# Patient Record
Sex: FEMALE | Race: AMERICAN INDIAN OR ALASKA NATIVE
[De-identification: names, ages, dates, MRNs, and addresses within clinical notes are randomized per-mention and may not be internally consistent; named-entity substitution may affect disease eponyms.]

---

## 2020-06-11 ENCOUNTER — HOSPITAL ENCOUNTER (EMERGENCY)
Dept: HOSPITAL 5 - ED | Age: 30
Discharge: HOME | End: 2020-06-11
Payer: SELF-PAY

## 2020-06-11 VITALS — SYSTOLIC BLOOD PRESSURE: 122 MMHG | DIASTOLIC BLOOD PRESSURE: 68 MMHG

## 2020-06-11 DIAGNOSIS — Z79.899: ICD-10-CM
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
## 2020-06-11 DIAGNOSIS — S05.02XA: Primary | ICD-10-CM
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
## 2020-06-11 DIAGNOSIS — Z98.890: ICD-10-CM

## 2020-06-11 NOTE — EMERGENCY DEPARTMENT REPORT
Eye Injury/Foreign Body





- HPI


Duration: Today


Eye Location: Left


Severity: Moderate


Tetanus Status: Up to Date


Eye Symptoms: Eye Pain: Yes, Blurred Vision: Yes, Eye Redness: Yes, 

Grinding/Hammering Metal: No, Used Eye Protection: No, Contact Lens Use: No, 

Recalls Injury: Yes, Photophobia: Yes


Other History: 30-year-old -American female presents to the emergency 

room complaining of left eye pain status post daughter accidentally scratched 

her eye this morning.  Patient reports that she has photophobia, eye tearing 

excessively, eye pain and mild swelling to the upper lid.





ED Review of Systems


ROS: 


Stated complaint: LEFT EYE PAIN


Other details as noted in HPI





Comment: All other systems reviewed and negative





ED Past Medical Hx





- Past Medical History


Hx Hypertension: No


Hx CVA: No


Hx Congestive Heart Failure: No


Hx Diabetes: No


Hx Deep Vein Thrombosis: No


Hx Renal Disease: No


Hx Sickle Cell Disease: No


Hx Seizures: No


Hx Asthma: No


Hx COPD: No


Hx HIV: No





- Surgical History


Past Surgical History?: Yes


Additional Surgical History: R Knee Sx 2001/ WRIST





- Social History


Smoking Status: Never Smoker


Substance Use Type: Alcohol





- Medications


Home Medications: 


                                Home Medications











 Medication  Instructions  Recorded  Confirmed  Last Taken  Type


 


Ferrous Sulfate 325 mg PO BID #60 tablet. 09/28/18  Unknown Rx


 


HYDROcodone/APAP 5-325 [[place] 1 each PO Q6HR PRN #20 tablet 09/28/18  Unknown Rx





5/325]     


 


Ibuprofen [Motrin] 800 mg PO Q8HR PRN #30 tablet 09/28/18  Unknown Rx


 


Erythromycin [Erythromycin Ophth 1 strip OS QID 10 Days #1 tube 06/11/20  

Unknown Rx





Oint]     














Eye Injury Exam





- Exam


General: 


Vital signs noted. No distress. Alert and acting appropriately.








- Visual Acuity


  ** Left


Eye Exam: Left Injection, Left EOMI, Left Fluorescein Uptake, Left Photophobia, 

Neither Abnormal Pupil, Neither Eye Foreign Body, Neither Lid Foreign Body, 

Neither Mucous Discharge, Neither Purulent Discharge





ED Course


                                   Vital Signs











  06/11/20





  15:26


 


Temperature 98.5 F


 


Pulse Rate 63


 


Respiratory 20





Rate 


 


Blood Pressure 124/90


 


O2 Sat by Pulse 99





Oximetry 














ED Medical Decision Making





- Medical Decision Making





30-year-old -American female presents to the emergency room complaining 

of left eye pain status post daughter accidentally scratched her eye this 

morning.  Patient reports that she has photophobia, eye tearing excessively, eye

pain and mild swelling to the upper lid.








Patient will be treated for corneal abrasion patient will be given Norco 5/325 

now and ibuprofen 600 mg p.o. now.  Patient be discharged home on erythromycin 

ophthalmic ointment every 6 hours for 10 days.  She was instructed to take 

ibuprofen 600 mg every 8 hours as needed for pain management.  Instructed to 

follow-up with an ophthalmologist.  Patient verbalized understanding


Critical care attestation.: 


If time is entered above; I have spent that time in minutes in the direct care 

of this critically ill patient, excluding procedure time.








ED Disposition


Clinical Impression: 


 Abrasion of cornea, left





Disposition: DC-01 TO HOME OR SELFCARE


Is pt being admited?: No


Does the pt Need Aspirin: No


Condition: Stable


Instructions:  Corneal Abrasion (ED)


Additional Instructions: 


Take ibuprofen as needed for pain management.  Use ointment as prescribed.  Is 

very important for you to follow-up with an ophthalmologist in the next 24 to 48

hours


Prescriptions: 


Erythromycin [Erythromycin Ophth Oint] 1 strip OS QID 10 Days #1 tube


Referrals: 


PRIMARY CARE,MD [Primary Care Provider] - 3-5 Days


ORLANDO TENA MD [Staff Physician] - 3-5 Days


[facility] [place] [Provider Group] - 3-5 Days


[facility] [Provider Group] - 3-5 Days


Forms:  Work/School Release Form(ED)

## 2020-06-11 NOTE — EMERGENCY DEPARTMENT REPORT
Blank Doc





- Documentation


Documentation: 





30-year-old female that presents with left eye pain after daugther accidently 

poked her.





This initial assessment/diagnostic orders/clinical plan/treatment(s) is/are 

subject to change based on patient's health status, clinical progression and re-

assessment by fellow clinical providers in the ED.  Further treatment and workup

at subsequent clinical providers discretion.  Patient/guardians urged not to 

elope from the ED as their condition may be serious if not clinically assessed 

and managed.  Initial orders include:


1- Patient sent to [facility] for further evaluation and treatment


2- woods lamp needed/visual testing

## 2021-07-12 ENCOUNTER — HOSPITAL ENCOUNTER (EMERGENCY)
Dept: HOSPITAL 5 - ED | Age: 31
Discharge: HOME | End: 2021-07-12
Payer: SELF-PAY

## 2021-07-12 VITALS — DIASTOLIC BLOOD PRESSURE: 73 MMHG | SYSTOLIC BLOOD PRESSURE: 138 MMHG

## 2021-07-12 DIAGNOSIS — O26.891: ICD-10-CM

## 2021-07-12 DIAGNOSIS — R10.2: ICD-10-CM

## 2021-07-12 DIAGNOSIS — O23.41: Primary | ICD-10-CM

## 2021-07-12 DIAGNOSIS — R30.0: ICD-10-CM

## 2021-07-12 DIAGNOSIS — O21.9: ICD-10-CM

## 2021-07-12 DIAGNOSIS — Z3A.01: ICD-10-CM

## 2021-07-12 DIAGNOSIS — Z79.899: ICD-10-CM

## 2021-07-12 LAB
ALBUMIN SERPL-MCNC: 4.6 G/DL (ref 3.9–5)
ALT SERPL-CCNC: 7 UNITS/L (ref 7–56)
BACTERIA #/AREA URNS HPF: (no result) /HPF
BASOPHILS # (AUTO): 0 K/MM3 (ref 0–0.1)
BASOPHILS NFR BLD AUTO: 0.4 % (ref 0–1.8)
BILIRUB UR QL STRIP: (no result)
BLOOD UR QL VISUAL: (no result)
BUN SERPL-MCNC: 7 MG/DL (ref 7–17)
BUN/CREAT SERPL: 14 %
CALCIUM SERPL-MCNC: 9.7 MG/DL (ref 8.4–10.2)
EOSINOPHIL # BLD AUTO: 0.1 K/MM3 (ref 0–0.4)
EOSINOPHIL NFR BLD AUTO: 1.5 % (ref 0–4.3)
HCT VFR BLD CALC: 41.4 % (ref 30.3–42.9)
HEMOLYSIS INDEX: 5
HGB BLD-MCNC: 13.8 GM/DL (ref 10.1–14.3)
LYMPHOCYTES # BLD AUTO: 2.1 K/MM3 (ref 1.2–5.4)
LYMPHOCYTES NFR BLD AUTO: 34.5 % (ref 13.4–35)
MCHC RBC AUTO-ENTMCNC: 33 % (ref 30–34)
MCV RBC AUTO: 87 FL (ref 79–97)
MONOCYTES # (AUTO): 0.3 K/MM3 (ref 0–0.8)
MONOCYTES % (AUTO): 5.6 % (ref 0–7.3)
MUCOUS THREADS #/AREA URNS HPF: (no result) /HPF
PH UR STRIP: 5 [PH] (ref 5–7)
PLATELET # BLD: 302 K/MM3 (ref 140–440)
RBC # BLD AUTO: 4.77 M/MM3 (ref 3.65–5.03)
RBC #/AREA URNS HPF: 16 /HPF (ref 0–6)
UROBILINOGEN UR-MCNC: 2 MG/DL (ref ?–2)
WBC #/AREA URNS HPF: 170 /HPF (ref 0–6)

## 2021-07-12 PROCEDURE — 76817 TRANSVAGINAL US OBSTETRIC: CPT

## 2021-07-12 PROCEDURE — 76801 OB US < 14 WKS SINGLE FETUS: CPT

## 2021-07-12 PROCEDURE — 85025 COMPLETE CBC W/AUTO DIFF WBC: CPT

## 2021-07-12 PROCEDURE — 96375 TX/PRO/DX INJ NEW DRUG ADDON: CPT

## 2021-07-12 PROCEDURE — 80053 COMPREHEN METABOLIC PANEL: CPT

## 2021-07-12 PROCEDURE — 81001 URINALYSIS AUTO W/SCOPE: CPT

## 2021-07-12 PROCEDURE — 36415 COLL VENOUS BLD VENIPUNCTURE: CPT

## 2021-07-12 PROCEDURE — 96365 THER/PROPH/DIAG IV INF INIT: CPT

## 2021-07-12 PROCEDURE — 99284 EMERGENCY DEPT VISIT MOD MDM: CPT

## 2021-07-12 PROCEDURE — 84702 CHORIONIC GONADOTROPIN TEST: CPT

## 2021-07-12 NOTE — ULTRASOUND REPORT
OB Ultrasound



HISTORY: pain in pregnancy.



TECHNIQUE:  Grayscale and color  imaging performed.



COMPARISON:  None



FINDINGS: Transabdominal and endovaginal imaging was performed.



Uterus measures 8.4 x 4.7 x 4.0 cm. There is an intrauterine gestational sac with mean diameter of 2.
3 cm. The size correlates with an EGA of 7 weeks and 2 days. A yolk sac is present. Fetal pole not vi
sualized at this point.



Both ovaries appear normal. There is trace simple pelvic free fluid.





IMPRESSION: Intrauterine gestational sac with a yolk sac identified but no fetal pole identified. Cor
relate with serial beta hCG and repeat pelvic ultrasound if needed.



Signer Name: Jose Fajardo MD 

Signed: 7/12/2021 12:39 PM

Workstation Name: [id number]

## 2021-07-12 NOTE — EVENT NOTE
ED Screening Note


Date of service: 07/12/21


Time: 10:51


ED Screening Note: 





Patient complains of nausea and vomiting x4 days


Currently 5 weeks pregnant


Also states left lower abdominal pain


Denies vaginal bleeding





This initial assessment/diagnostic orders/clinical plan/treatment(s) is/are 

subject to change based on patients health status, clinical progression and re-

assessment by fellow clinical providers in the ED. Further treatment and workup 

at subsequent clinical providers discretion. Patient/guardian urged not to elope

from the ED as their condition may be serious if not clinically assessed and 

managed. 





Initial orders include: 


Labs


Ultrasound

## 2021-07-12 NOTE — EMERGENCY DEPARTMENT REPORT
ED Pregnancy HPI





- General


Chief complaint: Nausea/Vomiting/Diarrhea


Stated complaint: VOMITING


Time Seen by Provider: 21 10:50


Source: patient


Mode of arrival: Ambulatory


Limitations: No Limitations





- History of Present Illness


Initial comments: 





This is a 31-year-old female  nontoxic, well nourished in appearance, no 

acute signs of distress presents to the ED with c/o of pelvic pressure, dysuria,

nausea vomiting times several days.  Patient stated she is currently about 6 

weeks pregnant.  Patient denies any vaginal bleeding or flank pain.  Patient 

that she does see her OB/GYN.  Patient denies any mid or upper abdominal pain.  

Patient denies any vaginal discharge or foul odor. Patient denies any chest 

pain, shortness of breathe, fever, chills, headache, stiff neck, numbness, 

tingling. Patient denies any urinary symptoms. Patient denies any allergies or 

PMH.


-: days(s)


Location: pelvis


Radiation: none


Severity: mild


Severity scale (0 -10): 3


Quality: other (pressure)


Consistency: intermittent


Improves with: none


Worsens with: other (urination)


Associated symptoms: nausea/vomiting, dysuria.  denies: vaginal bleeding, 

vaginal discharge, abdominal pain, headache, vision changes, malaise, 

dysparuenia, rash, seizure, shortness of breath, syncope, weakness


Vaginal bleeding: none


Pregnant:: Yes


Number of weeks pregnant: 6


Pre-patsy care: followed by OB





- Related Data


                                  Previous Rx's











 Medication  Instructions  Recorded  Last Taken  Type


 


Ferrous Sulfate 325 mg PO BID #60 tablet. 18 Unknown Rx


 


HYDROcodone/APAP 5-325 [[place] 1 each PO Q6HR PRN #20 tablet 18 Unknown Rx





5/325]    


 


Ibuprofen [Motrin] 800 mg PO Q8HR PRN #30 tablet 18 Unknown Rx


 


Erythromycin [Erythromycin Ophth 1 strip OS QID 10 Days #1 tube 20 Unknown

 Rx





Oint]    


 


Metoclopramide [Reglan] 10 mg PO Q12H PRN #12 tab 21 Unknown Rx


 


Nitrofurantoin Mono/M-Cryst 100 mg PO Q12HR #14 capsule 21 Unknown Rx





[Macrobid CAP]    











                                    Allergies











Allergy/AdvReac Type Severity Reaction Status Date / Time


 


No Known Allergies Allergy   Verified 20 15:22














ED Review of Systems


ROS: 


Stated complaint: VOMITING


Other details as noted in HPI





Comment: All other systems reviewed and negative


Constitutional: denies: chills, fever


Eyes: denies: eye pain, eye discharge, vision change


ENT: denies: ear pain, throat pain


Respiratory: denies: cough, shortness of breath, wheezing


Cardiovascular: denies: chest pain, palpitations


Endocrine: no symptoms reported


Gastrointestinal: denies: abdominal pain, nausea, diarrhea


Genitourinary: dysuria.  denies: urgency, frequency, hematuria, discharge, 

abnormal menses, dyspareunia


Musculoskeletal: denies: back pain, joint swelling, arthralgia


Skin: denies: rash, lesions


Neurological: denies: headache, weakness, paresthesias


Psychiatric: denies: anxiety, depression


Hematological/Lymphatic: denies: easy bleeding, easy bruising





ED Past Medical Hx





- Past Medical History


Hx Hypertension: No


Hx CVA: No


Hx Congestive Heart Failure: No


Hx Diabetes: No


Hx Deep Vein Thrombosis: No


Hx Renal Disease: No


Hx Sickle Cell Disease: No


Hx Seizures: No


Hx Asthma: No


Hx COPD: No


Hx HIV: No





- Surgical History


Additional Surgical History: R Knee Sx / WRIST





- Social History


Smoking Status: Never Smoker





- Medications


Home Medications: 


                                Home Medications











 Medication  Instructions  Recorded  Confirmed  Last Taken  Type


 


Ferrous Sulfate 325 mg PO BID #60 tablet. 18  Unknown Rx


 


HYDROcodone/APAP 5-325 [[place] 1 each PO Q6HR PRN #20 tablet 18  Unknown Rx





5/325]     


 


Ibuprofen [Motrin] 800 mg PO Q8HR PRN #30 tablet 18  Unknown Rx


 


Erythromycin [Erythromycin Ophth 1 strip OS QID 10 Days #1 tube 20  

Unknown Rx





Oint]     


 


Metoclopramide [Reglan] 10 mg PO Q12H PRN #12 tab 21  Unknown Rx


 


Nitrofurantoin Mono/M-Cryst 100 mg PO Q12HR #14 capsule 21  Unknown Rx





[Macrobid CAP]     














ED Physical Exam





- General


Limitations: No Limitations


General appearance: alert, in no apparent distress





- Head


Head exam: Present: atraumatic, normocephalic





- Eye


Eye exam: Present: normal appearance





- Neck


Neck exam: Present: normal inspection, full ROM.  Absent: lymphadenopathy





- Respiratory


Respiratory exam: Present: normal lung sounds bilaterally.  Absent: respiratory 

distress, wheezes, rales, rhonchi, stridor, chest wall tenderness, accessory 

muscle use, decreased breath sounds, prolonged expiratory





- Cardiovascular


Cardiovascular Exam: Present: regular rate, normal rhythm, normal heart sounds. 

Absent: bradycardia, tachycardia, irregular rhythm, systolic murmur, diastolic 

murmur, rubs, gallop





- GI/Abdominal


GI/Abdominal exam: Present: soft, normal bowel sounds.  Absent: distended, 

tenderness, guarding, rebound, rigid, diminished bowel sounds





- Extremities Exam


Extremities exam: Present: normal inspection, full ROM





- Back Exam


Back exam: Present: normal inspection, full ROM.  Absent: tenderness, CVA 

tenderness (R), CVA tenderness (L), muscle spasm, paraspinal tenderness, 

vertebral tenderness, rash noted





- Neurological Exam


Neurological exam: Present: alert, oriented X3, normal gait





- Psychiatric


Psychiatric exam: Present: normal affect, normal mood





- Skin


Skin exam: Present: warm, dry, intact, normal color.  Absent: rash





ED Course


                                   Vital Signs











  21





  10:47


 


Temperature 98.4 F


 


Pulse Rate 80


 


Respiratory 18





Rate 


 


Blood Pressure 120/73


 


O2 Sat by Pulse 100





Oximetry 














- Reevaluation(s)


Reevaluation #1: 





21 13:00


Patient is speaking in full sentences with no signs of distress noted.





ED Medical Decision Making





- Lab Data


Result diagrams: 


                                 21 10:55





                                 21 10:55








                                   Lab Results











  21 Range/Units





  10:55 10:55 10:55 


 


WBC  6.1    (4.5-11.0)  K/mm3


 


RBC  4.77    (3.65-5.03)  M/mm3


 


Hgb  13.8    (10.1-14.3)  gm/dl


 


Hct  41.4    (30.3-42.9)  %


 


MCV  87    (79-97)  fl


 


MCH  29    (28-32)  pg


 


MCHC  33    (30-34)  %


 


RDW  13.1 L    (13.2-15.2)  %


 


Plt Count  302    (140-440)  K/mm3


 


Lymph % (Auto)  34.5    (13.4-35.0)  %


 


Mono % (Auto)  5.6    (0.0-7.3)  %


 


Eos % (Auto)  1.5    (0.0-4.3)  %


 


Baso % (Auto)  0.4    (0.0-1.8)  %


 


Lymph # (Auto)  2.1    (1.2-5.4)  K/mm3


 


Mono # (Auto)  0.3    (0.0-0.8)  K/mm3


 


Eos # (Auto)  0.1    (0.0-0.4)  K/mm3


 


Baso # (Auto)  0.0    (0.0-0.1)  K/mm3


 


Seg Neutrophils %  58.0    (40.0-70.0)  %


 


Seg Neutrophils #  3.5    (1.8-7.7)  K/mm3


 


Sodium   138   (137-145)  mmol/L


 


Potassium   3.9   (3.6-5.0)  mmol/L


 


Chloride   101.6   ()  mmol/L


 


Carbon Dioxide   25   (22-30)  mmol/L


 


Anion Gap   15   mmol/L


 


BUN   7   (7-17)  mg/dL


 


Creatinine   0.5 L   (0.6-1.2)  mg/dL


 


Estimated GFR   > 60   ml/min


 


BUN/Creatinine Ratio   14   %


 


Glucose   84   ()  mg/dL


 


Calcium   9.7   (8.4-10.2)  mg/dL


 


Total Bilirubin   0.60   (0.1-1.2)  mg/dL


 


AST   12   (5-40)  units/L


 


ALT   7   (7-56)  units/L


 


Alkaline Phosphatase   41   ()  units/L


 


Albumin   4.6   (3.9-5)  g/dL


 


HCG, Quant    81725 H  (0-4)  mIU/mL


 


Urine Color     (Yellow)  


 


Urine Turbidity     (Clear)  


 


Urine pH     (5.0-7.0)  


 


Ur Specific Gravity     (1.003-1.030)  


 


Urine Protein     (Negative)  mg/dL


 


Urine Glucose (UA)     (Negative)  mg/dL


 


Urine Ketones     (Negative)  mg/dL


 


Urine Blood     (Negative)  


 


Urine Nitrite     (Negative)  


 


Urine Bilirubin     (Negative)  


 


Urine Urobilinogen     (<2.0)  mg/dL


 


Ur Leukocyte Esterase     (Negative)  


 


Urine WBC (Auto)     (0.0-6.0)  /HPF


 


Urine RBC (Auto)     (0.0-6.0)  /HPF


 


U Epithel Cells (Auto)     (0-13.0)  /HPF


 


Urine Bacteria (Auto)     (Negative)  /HPF


 


Urine Mucus     /HPF














  21 Range/Units





  Unknown 


 


WBC   (4.5-11.0)  K/mm3


 


RBC   (3.65-5.03)  M/mm3


 


Hgb   (10.1-14.3)  gm/dl


 


Hct   (30.3-42.9)  %


 


MCV   (79-97)  fl


 


MCH   (28-32)  pg


 


MCHC   (30-34)  %


 


RDW   (13.2-15.2)  %


 


Plt Count   (140-440)  K/mm3


 


Lymph % (Auto)   (13.4-35.0)  %


 


Mono % (Auto)   (0.0-7.3)  %


 


Eos % (Auto)   (0.0-4.3)  %


 


Baso % (Auto)   (0.0-1.8)  %


 


Lymph # (Auto)   (1.2-5.4)  K/mm3


 


Mono # (Auto)   (0.0-0.8)  K/mm3


 


Eos # (Auto)   (0.0-0.4)  K/mm3


 


Baso # (Auto)   (0.0-0.1)  K/mm3


 


Seg Neutrophils %   (40.0-70.0)  %


 


Seg Neutrophils #   (1.8-7.7)  K/mm3


 


Sodium   (137-145)  mmol/L


 


Potassium   (3.6-5.0)  mmol/L


 


Chloride   ()  mmol/L


 


Carbon Dioxide   (22-30)  mmol/L


 


Anion Gap   mmol/L


 


BUN   (7-17)  mg/dL


 


Creatinine   (0.6-1.2)  mg/dL


 


Estimated GFR   ml/min


 


BUN/Creatinine Ratio   %


 


Glucose   ()  mg/dL


 


Calcium   (8.4-10.2)  mg/dL


 


Total Bilirubin   (0.1-1.2)  mg/dL


 


AST   (5-40)  units/L


 


ALT   (7-56)  units/L


 


Alkaline Phosphatase   ()  units/L


 


Albumin   (3.9-5)  g/dL


 


HCG, Quant   (0-4)  mIU/mL


 


Urine Color  [place]  (Yellow)  


 


Urine Turbidity  Slightly-cloudy  (Clear)  


 


Urine pH  5.0  (5.0-7.0)  


 


Ur Specific Gravity  1.032 H  (1.003-1.030)  


 


Urine Protein  100 mg/dl  (Negative)  mg/dL


 


Urine Glucose (UA)  Neg  (Negative)  mg/dL


 


Urine Ketones  80  (Negative)  mg/dL


 


Urine Blood  Mod  (Negative)  


 


Urine Nitrite  Neg  (Negative)  


 


Urine Bilirubin  Neg  (Negative)  


 


Urine Urobilinogen  2.0  (<2.0)  mg/dL


 


Ur Leukocyte Esterase  Lg  (Negative)  


 


Urine WBC (Auto)  170.0 H  (0.0-6.0)  /HPF


 


Urine RBC (Auto)  16.0  (0.0-6.0)  /HPF


 


U Epithel Cells (Auto)  5.0  (0-13.0)  /HPF


 


Urine Bacteria (Auto)  1+  (Negative)  /HPF


 


Urine Mucus  2+  /HPF














- Radiology Data





[facility] [address] [place] [address] 

Ultrasound Report Signed Patient: ALDAIR DOZIER MR#: [id number] : 

1990 Acct:[id number] Age/Sex: 31 / F ADM Date: 21 Loc: ED 

Attending Dr: Ordering Physician: TRISH AL Date of Service: 21 

Procedure(s): US OB <= 14 weeks fetus Accession Number(s): [id number] cc: TRISH AL OB Ultrasound HISTORY: pain in pregnancy. TECHNIQUE: Grayscale and color

imaging performed. COMPARISON: None FINDINGS: Transabdominal and endovaginal 

imaging was performed. Uterus measures 8.4 x 4.7 x 4.0 cm. There is an 

intrauterine gestational sac with mean diameter of 2.3 cm. The size correlates 

with an EGA of 7 weeks and 2 days. A yolk sac is present. Fetal pole not 

visualized at this point. Both ovaries appear normal. There is trace simple 

pelvic free fluid. IMPRESSION: Intrauterine gestational sac with a yolk sac 

identified but no fetal pole identified. Correlate with serial beta hCG and 

repeat pelvic ultrasound if needed. Signer Name: Jose Fajardo MD Signed: 

2021 12:39 PM Workstation Name: [id number] Transcribed By: JW Dictated By:

Jose Fajardo MD Electronically Authenticated By: Jose Fajardo MD 

Signed Date/Time: 21 1239 DD/DT: 21 1236 TD/TT:





- Medical Decision Making





This is a 31-year-old female presents with UTI and pelvic pain during pregnancy.

 Patient is stable and was examined by me.  Normal abdominal exam. US OB 

obtained and dictated by the radiologist. Ua obtained.  Quantative serum test 

obtained. Patient notified of the US report with no questions noted by the 

patient.  Patient was instructed f/u with OB/GYN in 3-5 days for a repeat 

pregnancy quantitative test and possibly ultrasound due to no fetal heart tones 

noted.  Labs within normal limits.  At time of discharge, the patient does not 

seem toxic or ill in appearance.  No acute signs of distress noted.  Patient 

agrees to discharge treatment plan of care.  No further questions noted by the 

patient.


Critical care attestation.: 


If time is entered above; I have spent that time in minutes in the direct care 

of this critically ill patient, excluding procedure time.








ED Disposition


Clinical Impression: 


 Pelvic pain during pregnancy





UTI in pregnancy


Qualifiers:


 Trimester: first trimester Qualified Code(s): O23.41 - Unspecified infection of

urinary tract in pregnancy, first trimester





Disposition:  TO HOME OR SELFCARE


Is pt being admited?: No


Does the pt Need Aspirin: No


Condition: Stable


Instructions:  Urinary Tract Infection, Adult, Easy-to-Read


Additional Instructions: 


Follow-up with a OB/GYN doctor in 3-5 days for a repeat quantitative pregnancy 

test and possible ultrasound or if symptoms worsen and continue return to 

emergency room as soon as possible. 


Prescriptions: 


Nitrofurantoin Mono/M-Cryst [Macrobid CAP] 100 mg PO Q12HR #14 capsule


Metoclopramide [Reglan] 10 mg PO Q12H PRN #12 tab


 PRN Reason: Nausea


Referrals: 


[facility] [Primary Care Provider] - 3-5 Days


[facility] [Provider Group] - 3-5 Days


[facility] [Provider Group] - 3-5 Days


Forms:  Work/School Release Form(ED)


Time of Disposition: 13:23

## 2021-07-12 NOTE — ULTRASOUND REPORT
OB Ultrasound



HISTORY: pain in pregnancy.



TECHNIQUE:  Grayscale and color  imaging performed.



COMPARISON:  None



FINDINGS: Transabdominal and endovaginal imaging was performed.



Uterus measures 8.4 x 4.7 x 4.0 cm. There is an intrauterine gestational sac with mean diameter of 2.
3 cm. The size correlates with an EGA of 7 weeks and 2 days. A yolk sac is present. Fetal pole not vi
sualized at this point.



Both ovaries appear normal. There is trace simple pelvic free fluid.





IMPRESSION: Intrauterine gestational sac with a yolk sac identified but no fetal pole identified. Cor
relate with serial beta hCG and repeat pelvic ultrasound if needed.



Signer Name: Jose Fajardo MD 

Signed: 7/12/2021 12:39 PM

Workstation Name: [id number]

## 2021-12-17 ENCOUNTER — HOSPITAL ENCOUNTER (OUTPATIENT)
Dept: HOSPITAL 5 - LAB | Age: 31
Discharge: HOME | End: 2021-12-17
Attending: OBSTETRICS & GYNECOLOGY
Payer: MEDICAID

## 2021-12-17 DIAGNOSIS — O26.893: Primary | ICD-10-CM

## 2021-12-17 DIAGNOSIS — Z67.41: ICD-10-CM

## 2021-12-17 DIAGNOSIS — Z3A.28: ICD-10-CM

## 2021-12-17 PROCEDURE — 86900 BLOOD TYPING SEROLOGIC ABO: CPT

## 2021-12-17 PROCEDURE — 86850 RBC ANTIBODY SCREEN: CPT

## 2021-12-17 PROCEDURE — 96372 THER/PROPH/DIAG INJ SC/IM: CPT

## 2021-12-17 PROCEDURE — 86901 BLOOD TYPING SEROLOGIC RH(D): CPT
